# Patient Record
Sex: FEMALE | Race: BLACK OR AFRICAN AMERICAN | NOT HISPANIC OR LATINO | Employment: OTHER | URBAN - METROPOLITAN AREA
[De-identification: names, ages, dates, MRNs, and addresses within clinical notes are randomized per-mention and may not be internally consistent; named-entity substitution may affect disease eponyms.]

---

## 2022-09-27 PROBLEM — I10 ESSENTIAL HYPERTENSION: Status: ACTIVE | Noted: 2022-06-28

## 2022-09-27 PROBLEM — E78.00 HYPERCHOLESTEROLEMIA: Status: ACTIVE | Noted: 2022-02-03

## 2022-09-27 PROBLEM — J01.00 ACUTE MAXILLARY SINUSITIS: Status: ACTIVE | Noted: 2022-01-13

## 2022-09-27 PROBLEM — D50.9 IRON DEFICIENCY ANEMIA: Status: ACTIVE | Noted: 2021-03-29

## 2022-09-27 PROBLEM — K21.9 GASTROESOPHAGEAL REFLUX DISEASE: Status: ACTIVE | Noted: 2021-02-08

## 2023-01-02 PROBLEM — J01.00 ACUTE MAXILLARY SINUSITIS: Status: RESOLVED | Noted: 2022-01-13 | Resolved: 2023-01-02

## 2023-08-16 ENCOUNTER — ON-DEMAND VIRTUAL (OUTPATIENT)
Dept: URGENT CARE | Facility: CLINIC | Age: 53
End: 2023-08-16
Payer: MEDICAID

## 2023-08-16 DIAGNOSIS — F41.9 ANXIETY: Primary | ICD-10-CM

## 2023-08-16 PROCEDURE — 99212 OFFICE O/P EST SF 10 MIN: CPT | Mod: 95,,,

## 2023-08-16 PROCEDURE — 99212 PR OFFICE/OUTPT VISIT, EST, LEVL II, 10-19 MIN: ICD-10-PCS | Mod: 95,,,

## 2023-08-16 NOTE — PATIENT INSTRUCTIONS
Follow up with PCP for medication evaluation  If you develop any thoughts of harming self or others go to the nearest emergency room.

## 2023-08-16 NOTE — PROGRESS NOTES
Subjective:      Patient ID: Latanya Mariee is a 53 y.o. female.    Vitals:  vitals were not taken for this visit.     Chief Complaint: Headache and Anxiety      Visit Type: TELE AUDIOVISUAL    Present with the patient at the time of consultation: TELEMED PRESENT WITH PATIENT: None    Past Medical History:   Diagnosis Date    Carpal tunnel syndrome     Depression     Dysfunctional uterine bleeding     GERD (gastroesophageal reflux disease)     IBS (irritable bowel syndrome)     Iron deficiency     Localized pain of right shoulder joint     Low back pain     Neck pain      Past Surgical History:   Procedure Laterality Date    leigh / BTL / facial recon       Review of patient's allergies indicates:   Allergen Reactions    Ibuprofen      Current Outpatient Medications on File Prior to Visit   Medication Sig Dispense Refill    ciprofloxacin HCl (CIPRO) 500 MG tablet ciprofloxacin 500 mg tablet      cyclobenzaprine (FLEXERIL) 10 MG tablet cyclobenzaprine 10 mg tablet      dicyclomine (BENTYL) 20 mg tablet dicyclomine 20 mg tablet      escitalopram oxalate (LEXAPRO) 20 MG tablet Lexapro 20 mg tablet   Take 1 tablet every day by oral route.      ferrous sulfate (FEOSOL) 325 mg (65 mg iron) Tab tablet ferrous sulfate 325 mg (65 mg iron) tablet      ferumoxytoL (FERAHEME) 510 mg/17 mL (30 mg/mL) Soln Feraheme 510 mg/17 mL (30 mg/mL) intravenous solution      meloxicam (MOBIC) 7.5 MG tablet meloxicam 7.5 mg tablet      pantoprazole (PROTONIX) 40 MG tablet Take 1 tablet (40 mg total) by mouth 2 (two) times daily. 60 tablet 11    predniSONE (DELTASONE) 10 MG tablet prednisone 10 mg tablet       No current facility-administered medications on file prior to visit.     Family History   Problem Relation Age of Onset    Diabetes Mother     Hypertension Mother     Diabetes Father     Hypertension Father            [facility] P[facility] Intake    8/16/2023  2:08 PM CDT - Filed by Patient   Describe your reason for todays visit Anxiety    What is your current physical address in the event of a medical emergency? [address] [facility]   Are you able to take your vital signs? Yes   Systolic Blood Pressure: 158   Diastolic Blood Pressure: 95   Weight: 224   Height: 64   Pulse:    Temperature: 96.5   Respiration rate:    Pulse Oxygen:    Please attach any relevant images or files          Patient states that she is currently having a headache with increased anxiety attacks. Patient states that this has been going on for the last 2-3 weeks. Patient states that she is currently taking lexapro for anxiety for the past 10 years. Patient states that she is not sure if the medication is no longer working at this time. Patient denies any thoughts of hurting self or other. Patient states that the headache she is having is due to the anxiety patient states that she does normally get headaches with her anxiety.     Headache     Anxiety  Symptoms include nervous/anxious behavior. Patient reports no suicidal ideas.           Constitution: Negative.   HENT: Negative.     Neck: neck negative.   Eyes: Negative.    Respiratory: Negative.     Gastrointestinal: Negative.    Genitourinary: Negative.    Musculoskeletal: Negative.    Skin: Negative.    Allergic/Immunologic: Negative.    Neurological:  Positive for headaches.   Hematologic/Lymphatic: Negative.    Psychiatric/Behavioral:  Positive for nervous/anxious. Negative for homicidal ideas and suicidal ideas. The patient is nervous/anxious.         Objective:   The physical exam was conducted virtually.  Physical Exam   Constitutional: normal  HENT:   Head: Normocephalic and atraumatic.   Eyes: Conjunctivae are normal. Pupils are equal, round, and reactive to light. Extraocular movement intact   Neck: Neck supple.   Pulmonary/Chest: Effort normal.   Abdominal: Normal appearance.   Musculoskeletal: Normal range of motion.         General: Normal range of motion.   Neurological: She is alert.    Skin: Skin is warm.   Psychiatric: Her behavior is normal. Mood, judgment and thought content normal.       Assessment:     1. Anxiety        Plan:       Anxiety

## 2023-10-25 PROBLEM — G43.109 MIGRAINE WITH AURA: Status: ACTIVE | Noted: 2023-10-25

## 2023-10-25 PROBLEM — E66.01 CLASS 3 SEVERE OBESITY IN ADULT: Status: ACTIVE | Noted: 2023-10-25

## 2023-12-18 PROBLEM — G43.E09 CHRONIC MIGRAINE WITH AURA WITHOUT STATUS MIGRAINOSUS, NOT INTRACTABLE: Status: ACTIVE | Noted: 2023-12-18

## 2023-12-18 PROBLEM — Z91.89 AT RISK FOR OBSTRUCTIVE SLEEP APNEA: Status: ACTIVE | Noted: 2023-12-18

## 2024-01-09 PROBLEM — G47.30 SLEEP-DISORDERED BREATHING: Status: ACTIVE | Noted: 2024-01-09
